# Patient Record
Sex: MALE | Race: WHITE | Employment: STUDENT | URBAN - METROPOLITAN AREA
[De-identification: names, ages, dates, MRNs, and addresses within clinical notes are randomized per-mention and may not be internally consistent; named-entity substitution may affect disease eponyms.]

---
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
## 2021-01-08 ENCOUNTER — HOSPITAL ENCOUNTER (OUTPATIENT)
Dept: PHYSICAL THERAPY | Age: 21
Setting detail: THERAPIES SERIES
Discharge: HOME | End: 2021-01-08
Payer: COMMERCIAL

## 2021-01-08 PROCEDURE — 97112 NEUROMUSCULAR REEDUCATION: CPT | Performed by: PHYSICAL THERAPIST

## 2021-01-08 PROCEDURE — 97530 THERAPEUTIC ACTIVITIES: CPT | Performed by: PHYSICAL THERAPIST

## 2021-01-08 PROCEDURE — 97110 THERAPEUTIC EXERCISES: CPT | Performed by: PHYSICAL THERAPIST

## 2021-01-08 NOTE — FLOWSHEET NOTE
[address] [facility],[address], [address] [facility], [address]  Phone: [phone]   Fax: [phone]      Physical Therapy Treatment Note/ Progress Report:       Date:  2021    Patient Name:  Natalie Bahena    :  2000  MRN: [id number]  Restrictions/Precautions:    Medical/Treatment Diagnosis Information:  · Diagnosis: M25.512 (ICD-10-CM) - Acute pain of left shoulder / MDI  · Treatment Diagnosis: M25.512 (ICD-10-CM) - Acute pain of left shoulder / MDI  Insurance/Certification information:  PT Insurance Information: [employer] [place]- BMN  Physician Information:  Referring Practitioner: Karen Palacios  Has the plan of care been signed (Y/N):        []  Yes  [x]  No     Date of Patient follow up with Physician: YOGI      Is this a Progress Report:     []  Yes  [x]  No        If Yes:  Date Range for reporting period:  Beginning  Ending    Progress report will be due (10 Rx or 30 days whichever is less):        Recertification will be due (POC Duration  / 90 days whichever is less): see above    Precautions/ Contra-indications:     C-SSRS Triggered by Intake questionnaire (Past 2 wk assessment):   [x] No, Questionnaire did not trigger screening.   [] Yes, Patient intake triggered further evaluation      [] C-SSRS Screening completed  [] PCP notified via Plan of Care  [] Emergency services notified     Visit # Insurance Allowable Auth Required   6 BMN []  Yes [x]  No        Functional Scale: UEFI= 91.25%   Date assessed:  20-    Latex Allergy:  [x]NO      []YES-  Preferred Language for Healthcare:   [x]English       []other:    Pain level:    @ rest=   0 /10   Worst over last 24 Hours=   0  /10    Subjective: The patient noted that his shld is feeling more stab and \"shifting\" is much less.      OBJECTIVE:      TTP: UT / LS and Infraspinatus moderate level of pain and guarding.      CERV ROM       Cervical Flexion       Cervical Extension       Left Right   Cervical SB       Cervical rotation             MMT Left Right   MMT Left Right    Flexion   5   Subscapularis- lift off    4+   Abduction   5   Bicep       ER neutral   4+   Tricep       IR neutral   4+    Level 1       Supraspinatus   4+    Level 2       Infraspinatus        Level 3       Lower Trap       ER @ 90-90       Mid Trap       IR @ 90-90       Rhomboids       Abdmonial       Teres Minor       Trunk Extension             ROM / MM Flexibility Left Right   ROM Left Right   Flexion 170 180   Hamstring 90/90       Abduction 108 w P 180   Pec Major       ER @ 90 90 90   Pec Minor       IR  @ 90 50 75   ITB- Obers       Adduction       Quad Prone       Total Arc       Hip Flexion- Vieyra       % Difference of Total Arc                     Postural Alignment     Kiblers Scapula Dysfunction Classification      Scapula Resting Position  (hands at sides)        Static: []? 1-Inferior [x]? 2-Medial []? 3-Superior         Scapular Resting Position  (hands on hips)     Dynamic: []? 1-Inferior [x]? 2-Medial []?  3-Superior      Scapular Mobility 10 reps flexion= + dysk  10 reps abd=                 Special Tests Left Right   Apley Scratch IR:  ER:   Cross body: IR:  ER:  Cross Body:   Neer's +     Full Can       Empty Can       Shelia Snooks +     Nerve Tension Testing       Speed's       Fletcher's        Anterior Drawer       MDI +             Spurling's -          Exercises:   Sets Reps Resistance Other comments         ROM / Stretching       Pendulum       Supine wand ER @ 0    [] 0        [] 45       [] 90   Table slides- Flexion    [] Flexion      [] Scap      [] Abd   Pulleys    [] Flexion      [] Scap      [] Abd   Wall crawls    [] Flexion      [] Scap      [] Abd   IR- Cane behind back        IR- Towel behind back       IR- SL- Sleeper       IR- Bully       UT- Stretch    LS- Stretch    CBA seated    [] Seated         [] @ EOB- SL             Isometrics              Flexion Abduction       External Rotation       Internal Rotation       Biceps       Triceps                 PRE's       Flexion       Abduction       Scaption       External Rotation 3  10 2 In side plank   Internal Rotation       Shrugs       EXT       Reverse Flys       Serratus 3 10 15#    Horizontal Abd with ER       Biceps       Triceps       Retraction       Prone T    [] on T ball        [] on EOB    Prone Y    [] on T ball        [] on EOB     Prone I    [] on T ball        [] on EOB                Cable Column/Theraband       External Rotation       Internal Rotation 3 10 silver    Shrugs       Lats 3 10 8 Plates    Ext 3 10 Silver     Flex       Scapular Retraction  Reverse   3 10 10 plates  10 plates     BIC       TRIC       PNF       Supine reverse T single arm 3 10 Green    RTB wall slides- 12 and 3        Supine serratus clock 1 to 6 and 12 to 3  3 10 Green    Supine scaption 2 10 Green  PNF pattern          Prone T Ball 3 10 2#    Prone Y Ball 3 10 0           Serratus plank on knees        UE [place]       Dynamic Stability       BOW wall  2# 30 3 CW / CCW   Plyoback       Body Blade with wall sit  30 3 [] Flexion  [] Scap  [] Abd   [x] ER/IR   Manual Intervention               K taping [facility]          Access Code: [email or web address]   URL: [email or web address]   Date: 11/20/2020   Prepared by: Baldo Coronel     Exercises   Seated Upper Trapezius Stretch - 5 reps - 1 sets - 30 hold - 1x daily - 7x weekly   Seated Levator Scapulae Stretch - 5 reps - 1 sets - 30 hold - 1x daily - 7x weekly   Supine Scapular Protraction in Flexion with Dumbbells - 10 reps - 3 sets - 1x daily - 7x weekly   Standing Shoulder Internal Rotation with Anchored Resistance - 10 reps - 3 sets - 1x daily - 7x weekly   Scapular Retraction with Resistance - 10 reps - 3 sets - 1x daily - 7x weekly   Shoulder External Rotation Reactive Isometrics - 10 reps - 1 sets - 10 hold - 1x daily - 7x weekly       Therapeutic Exercise and NMR EXR  [x] (01977) Provided verbal/tactile cueing for activities related to strengthening, flexibility, endurance, ROM for improvements in LE, proximal hip, and core control with self care, mobility, lifting, ambulation. [x] (09501) Provided verbal/tactile cueing for activities related to improving balance, coordination, kinesthetic sense, posture, motor skill, proprioception to assist with LE, proximal hip, and core control in self-care, mobility, lifting, ambulation and eccentric single leg control.      NMR and Therapeutic Activities:    [x] (23583 or 33441) Provided verbal/tactile cueing for activities related to improving balance, coordination, kinesthetic sense, posture, motor skill, proprioception and motor activation to allow for proper function of core, proximal hip and LE with self-care and ADLs and functional mobility.   [] (89291) Gait Re-education- Provided training and instruction to the patient for proper LE, core and proximal hip recruitment and positioning and eccentric body weight control with ambulation re-education including up and down stairs     Home Exercise Program:    [x] (99390) Reviewed/Progressed HEP activities related to strengthening, flexibility, endurance, ROM of core, proximal hip and LE for functional self-care, mobility, lifting and ambulation/stair navigation   [] (61424) Reviewed/Progressed HEP activities related to improving balance, coordination, kinesthetic sense, posture, motor skill, proprioception of core, proximal hip and LE for self-care, mobility, lifting, and ambulation/stair navigation      Manual Treatments:  PROM / STM / Oscillations-Mobs:  G-I, II, III, IV (PA's, Inf., Post.)  [] (36639) Provided manual therapy to mobilize LE, proximal hip and/or LS spine soft tissue/joints for the purpose of modulating pain, promoting relaxation, increasing ROM, reducing/eliminating soft tissue swelling/inflammation/restriction, improving soft tissue extensibility and allowing for proper ROM for normal function with self-care, mobility, lifting and ambulation. Modalities:     [] GAME READY (VASO)- for significant edema, swelling, pain control. Charges:  Timed Code Treatment Minutes: 65   Total Treatment Minutes: 68      [] EVAL (LOW) 05056 (typically 20 minutes face-to-face)  [] EVAL (MOD) 33205 (typically 30 minutes face-to-face)  [] EVAL (HIGH) 22976 (typically 45 minutes face-to-face)  [] RE-EVAL     [x] QH(69144) x  2   [] IONTO  [x] NMR (58265) x   1  [] VASO  [] Manual (83059) x     [] Other:  [x] TA x   1   [] Mech Traction (51160)  [] ES(attended) (34440)     [] ES (un) (29692):       ASSESSMENT:      GOALS:   Patient stated goal: use are without pain    [] Progressing: [] Met: [] Not Met: [] Adjusted    Therapist goals for Patient:   Short Term Goals: To be achieved in: 2 weeks  1. Independent in HEP and progression per patient tolerance, in order to prevent re-injury. [] Progressing: [] Met: [] Not Met: [] Adjusted   2. Patient will have a decrease in pain to facilitate improvement in movement, function, and ADLs as indicated by Functional Deficits. [] Progressing: [] Met: [] Not Met: [] Adjusted  3. Patient will report pain at worst less than or equal to 3/10. [] Progressing: [] Met: [] Not Met: [] Adjusted    Long Term Goals: To be achieved in: 4 weeks  1. Disability index score of 50% or less for the UEFI to assist with reaching prior level of function. [] Progressing: [] Met: [] Not Met: [] Adjusted  2. Patient will demonstrate increased AROM to LUE to allow for proper joint functioning as indicated by patients Functional Deficits. [] Progressing: [] Met: [] Not Met: [] Adjusted  3. Patient will demonstrate an increase in Strength to LUE to allow for proper functional mobility as indicated by patients Functional Deficits. [] Progressing: [] Met: [] Not Met: [] Adjusted  4.  Patient will return to independent functional activities without increased symptoms or restriction. [] Progressing: [] Met: [] Not Met: [] Adjusted  5. Patient able to reach overhead with limitations for ADL's     [] Progressing: [] Met: [] Not Met: [] Adjusted   6. Patient will report pain at worst less than or equal to 0/10. [] Progressing: [] Met: [] Not Met: [] Adjusted      Overall Progression Towards Functional goals/ Treatment Progress Update:  [] Patient is progressing as expected towards functional goals listed. [] Progression is slowed due to complexities/Impairments listed. [] Progression has been slowed due to co-morbidities. [x] Plan just implemented, too soon to assess goals progression <30days   [] Goals require adjustment due to lack of progress  [] Patient is not progressing as expected and requires additional follow up with physician  [] Other    Prognosis for POC: [x] Good [] Fair  [] Poor      Patient requires continued skilled intervention: [x] Yes  [] No    Treatment/Activity Tolerance:  [x] Patient able to complete treatment  [] Patient limited by fatigue  [] Patient limited by pain     [] Patient limited by other medical complications  [] Other: Mild to moderate fatigue at end of session. Updated activities in [facility]. The patient is ready to start some closed chain activities. Return to Play: (if applicable)   []  Stage 1: Intro to Strength   []  Stage 2: Return to Run and Strength   []  Stage 3: Return to Jump and Strength   []  Stage 4: Dynamic Strength and Agility   []  Stage 5: Sport Specific Training     []  Ready to Return to Play, Meets All Above Stages   []  Not Ready for Return to Sports   Comments:                           PLAN: Advance PRE's and dynamic stability training as tolerated. Consider adding closed chain activities NPV.  See in 2 weeks   [x] Continue per plan of care [] Alter current plan (see comments above)  [] Plan of care initiated [] Hold pending MD visit [] Discharge      Electronically signed by:  Valentine Tom PT      Note: If patient does not return for scheduled/ recommended follow up visits, this note will serve as a discharge from care along with most recent update on progress.

## 2021-01-14 ENCOUNTER — OFFICE VISIT (OUTPATIENT)
Dept: PRIMARY CARE CLINIC | Age: 21
End: 2021-01-14
Payer: COMMERCIAL

## 2021-01-14 DIAGNOSIS — Z20.822 EXPOSURE TO COVID-19 VIRUS: Primary | ICD-10-CM

## 2021-01-14 PROCEDURE — 99211 OFF/OP EST MAY X REQ PHY/QHP: CPT | Performed by: NURSE PRACTITIONER

## 2021-01-14 NOTE — PROGRESS NOTES
Claduio Moore received a viral test for COVID-19. They were educated on isolation and quarantine as appropriate. For any symptoms, they were directed to seek care from their PCP, given contact information to establish with a doctor, directed to an urgent care or the emergency room.

## 2021-01-15 LAB — SARS-COV-2, NAA: NOT DETECTED

## 2021-02-14 ENCOUNTER — IMMUNIZATION (OUTPATIENT)
Dept: PRIMARY CARE CLINIC | Age: 21
End: 2021-02-14
Payer: COMMERCIAL

## 2021-02-14 PROCEDURE — 91301 COVID-19, MODERNA VACCINE 100MCG/0.5ML DOSE: CPT | Performed by: FAMILY MEDICINE

## 2021-02-14 PROCEDURE — 0011A COVID-19, MODERNA VACCINE 100MCG/0.5ML DOSE: CPT | Performed by: FAMILY MEDICINE

## 2021-02-18 ENCOUNTER — OFFICE VISIT (OUTPATIENT)
Dept: ORTHOPEDIC SURGERY | Age: 21
End: 2021-02-18
Payer: COMMERCIAL

## 2021-02-18 VITALS
BODY MASS INDEX: 22.88 KG/M2 | DIASTOLIC BLOOD PRESSURE: 74 MMHG | HEIGHT: 68 IN | RESPIRATION RATE: 12 BRPM | WEIGHT: 151 LBS | HEART RATE: 54 BPM | SYSTOLIC BLOOD PRESSURE: 136 MMHG | TEMPERATURE: 97.6 F

## 2021-02-18 DIAGNOSIS — M25.312 INSTABILITY OF LEFT SHOULDER JOINT: Primary | ICD-10-CM

## 2021-02-18 PROCEDURE — 99212 OFFICE O/P EST SF 10 MIN: CPT | Performed by: ORTHOPAEDIC SURGERY

## 2021-02-18 NOTE — PROGRESS NOTES
Geoffrey Alexander turns today for his left shoulder. He is doing very well. He reports no pain at this point. He says his shoulder really feels much more stable and he said no episodes of instability recently. He no longer feels the need to wear his. He feels confident in his home exercise program.          Patient's medications, allergies, past medical, surgical, social and family histories were reviewed and updated as appropriate. Relevant review of systems reviewed. General Exam:    Vitals: Blood pressure 136/74, pulse 54, temperature 97.6 °F (36.4 °C), temperature source Infrared, resp. rate 12, height 5' 8\" (1.727 m), weight 151 lb (68.5 kg). Constitutional: Patient is adequately groomed with no evidence of malnutrition  Mental Status: The patient is oriented to time, place and person. The patient's mood and affect are appropriate. Left shoulder exam is very benign. He has good strength with normal motion. I cannot elicit discomfort. Assessment: Doing well with conservative management for multidirectional instability left shoulder. Plan: I encouraged him to maintain his exercise program.  When he resumes contact and collision sports including soccer he should wear his brace. He agrees with this plan. Follow-up with me on an as-needed basis. This note was created using voice recognition software. It has been proofread, but occasionally errors remain. Please disregard these errors. They will be corrected as they are noted.

## 2021-03-17 ENCOUNTER — IMMUNIZATION (OUTPATIENT)
Dept: PRIMARY CARE CLINIC | Age: 21
End: 2021-03-17
Payer: COMMERCIAL

## 2021-03-17 PROCEDURE — 0012A COVID-19, MODERNA VACCINE 100MCG/0.5ML DOSE: CPT | Performed by: FAMILY MEDICINE

## 2021-03-17 PROCEDURE — 91301 COVID-19, MODERNA VACCINE 100MCG/0.5ML DOSE: CPT | Performed by: FAMILY MEDICINE

## 2021-08-13 ENCOUNTER — OFFICE VISIT (OUTPATIENT)
Dept: FAMILY MEDICINE CLINIC | Age: 21
End: 2021-08-13
Payer: COMMERCIAL

## 2021-08-13 VITALS
BODY MASS INDEX: 21.96 KG/M2 | HEIGHT: 70 IN | HEART RATE: 61 BPM | WEIGHT: 153.4 LBS | OXYGEN SATURATION: 98 % | TEMPERATURE: 98.4 F | SYSTOLIC BLOOD PRESSURE: 98 MMHG | DIASTOLIC BLOOD PRESSURE: 70 MMHG

## 2021-08-13 DIAGNOSIS — L70.0 ACNE VULGARIS: ICD-10-CM

## 2021-08-13 DIAGNOSIS — Z00.00 WELL ADULT HEALTH CHECK: Primary | ICD-10-CM

## 2021-08-13 PROCEDURE — 99395 PREV VISIT EST AGE 18-39: CPT | Performed by: FAMILY MEDICINE

## 2021-08-13 ASSESSMENT — ENCOUNTER SYMPTOMS
NAUSEA: 0
SORE THROAT: 0
SHORTNESS OF BREATH: 0
COLOR CHANGE: 0
VOMITING: 0
SINUS PRESSURE: 0
DIARRHEA: 0
COUGH: 0
EYE REDNESS: 0

## 2021-08-13 ASSESSMENT — PATIENT HEALTH QUESTIONNAIRE - PHQ9
SUM OF ALL RESPONSES TO PHQ QUESTIONS 1-9: 0
SUM OF ALL RESPONSES TO PHQ QUESTIONS 1-9: 0
2. FEELING DOWN, DEPRESSED OR HOPELESS: 0
SUM OF ALL RESPONSES TO PHQ9 QUESTIONS 1 & 2: 0
1. LITTLE INTEREST OR PLEASURE IN DOING THINGS: 0
SUM OF ALL RESPONSES TO PHQ QUESTIONS 1-9: 0

## 2021-08-13 NOTE — PROGRESS NOTES
8/13/2021    This is a 24 y.o. 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 HENT: Negative for congestion, sinus pressure and sore throat. Eyes: Negative for redness and visual disturbance. Respiratory: Negative for cough and shortness of breath. Cardiovascular: Negative for chest pain and leg swelling. Gastrointestinal: Negative for diarrhea, nausea and vomiting. Genitourinary: Negative for difficulty urinating. Skin: Negative for color change and rash. Neurological: Negative for dizziness and headaches. Psychiatric/Behavioral: Negative for confusion. Current Outpatient Medications   Medication Sig Dispense Refill    naproxen (NAPROSYN) 500 MG tablet Take 1 tablet by mouth 2 times daily (with meals) 60 tablet 2    cephALEXin (KEFLEX) 500 MG capsule TAKE 1 CAPSULE BY MOUTH ONE TIME A DAY 90 capsule 2    benzoyl peroxide-erythromycin (BENZAMYCIN) 5-3 % gel APPLY TOPICALLY TWO TIMES A DAY 46.6 g 5    Multiple Vitamin (MULTI-DAY VITAMINS PO) Take by mouth      Cetirizine HCl (ZYRTEC PO) Take by mouth       No current facility-administered medications for this visit. BP 98/70 (Site: Left Upper Arm, Position: Sitting, Cuff Size: Medium Adult)   Pulse 61   Temp 98.4 °F (36.9 °C)   Ht 5' 9.69\" (1.77 m)   Wt 153 lb 6.4 oz (69.6 kg)   SpO2 98%   BMI 22.21 kg/m²     Physical Exam  Constitutional:       Appearance: He is well-developed. HENT:      Head: Normocephalic and atraumatic. Eyes:      Conjunctiva/sclera: Conjunctivae normal.   Neck:      Thyroid: No thyromegaly.    Cardiovascular:      Rate and Rhythm: Normal rate and regular rhythm. Heart sounds: Normal heart sounds. No murmur heard. Pulmonary:      Effort: Pulmonary effort is normal.      Breath sounds: Normal breath sounds. No wheezing. Abdominal:      General: Bowel sounds are normal.      Palpations: Abdomen is soft. There is no mass. Tenderness: There is no abdominal tenderness. Musculoskeletal:         General: Normal range of motion. Cervical back: Normal range of motion and neck supple. Lymphadenopathy:      Cervical: No cervical adenopathy. Skin:     General: Skin is warm and dry. Findings: No rash. Comments: Normal turgor   Neurological:      Mental Status: He is alert and oriented to person, place, and time. Deep Tendon Reflexes: Reflexes normal.   Psychiatric:         Thought Content: Thought content normal.         Wt Readings from Last 3 Encounters:   08/13/21 153 lb 6.4 oz (69.6 kg) (48 %, Z= -0.06)*   02/18/21 151 lb (68.5 kg) (46 %, Z= -0.09)*   12/19/20 151 lb (68.5 kg) (47 %, Z= -0.06)*     * Growth percentiles are based on CDC (Boys, 2-20 Years) data. BP Readings from Last 3 Encounters:   08/13/21 98/70   02/18/21 136/74   10/02/20 110/70       Assessment/Plan:  1. Well adult health check    2. Acne vulgaris    That is doing well overall. We reviewed his goals, he is working hard to achieve these with his academics. He is making good positive healthy choices. He will be discontinuing the Keflex for his acne as he feels it is pretty well controlled. We discussed he can restart this if needed if the acne flares up significantly.     Physical in 1 year

## 2022-01-07 ENCOUNTER — NURSE ONLY (OUTPATIENT)
Dept: PRIMARY CARE CLINIC | Age: 22
End: 2022-01-07

## 2022-01-07 DIAGNOSIS — Z11.52 ENCOUNTER FOR SCREENING FOR COVID-19: Primary | ICD-10-CM

## 2022-01-08 LAB — SARS-COV-2: NOT DETECTED

## 2023-02-28 ENCOUNTER — TELEPHONE (OUTPATIENT)
Dept: FAMILY MEDICINE CLINIC | Age: 23
End: 2023-02-28

## 2023-02-28 NOTE — TELEPHONE ENCOUNTER
----- Message from [facility] sent at 2/28/2023 12:34 PM EST -----  Subject: Appointment Request    Reason for Call: Established Patient Appointment needed: Urgent Abdominal   Pain    QUESTIONS    Reason for appointment request? Available appointments did not meet   patient need     Additional Information for Provider? please call pt to schedule appt for   c/o nausea after eating meals/with car rides attempted to schedule   (Urgent) in  but cannot do today or tomorrow morning wants to be   scheduled next week please call pt   ---------------------------------------------------------------------------  --------------  [phone] "[employer]  [phone];  Do not leave any message, patient will call back for answer  ---------------------------------------------------------------------------  --------------  SCRIPT ANSWERS  COVID Screen: [place]

## 2023-03-06 ENCOUNTER — OFFICE VISIT (OUTPATIENT)
Dept: FAMILY MEDICINE CLINIC | Age: 23
End: 2023-03-06
Payer: COMMERCIAL

## 2023-03-06 VITALS
HEART RATE: 53 BPM | SYSTOLIC BLOOD PRESSURE: 108 MMHG | DIASTOLIC BLOOD PRESSURE: 60 MMHG | WEIGHT: 153 LBS | OXYGEN SATURATION: 100 % | HEIGHT: 69 IN | RESPIRATION RATE: 16 BRPM | BODY MASS INDEX: 22.66 KG/M2

## 2023-03-06 DIAGNOSIS — R19.4 CHANGE IN BOWEL HABIT: ICD-10-CM

## 2023-03-06 DIAGNOSIS — R19.4 CHANGE IN BOWEL HABIT: Primary | ICD-10-CM

## 2023-03-06 DIAGNOSIS — F43.9 STRESS: ICD-10-CM

## 2023-03-06 LAB
BASOPHILS ABSOLUTE: 0 K/UL (ref 0–0.2)
BASOPHILS RELATIVE PERCENT: 0.2 %
C-REACTIVE PROTEIN: <3 MG/L (ref 0–5.1)
EOSINOPHILS ABSOLUTE: 0.2 K/UL (ref 0–0.6)
EOSINOPHILS RELATIVE PERCENT: 3.1 %
HCT VFR BLD CALC: 42.4 % (ref 40.5–52.5)
HEMOGLOBIN: 14.7 G/DL (ref 13.5–17.5)
IGA: 173 MG/DL (ref 70–400)
LYMPHOCYTES ABSOLUTE: 2.8 K/UL (ref 1–5.1)
LYMPHOCYTES RELATIVE PERCENT: 45.5 %
MCH RBC QN AUTO: 30.4 PG (ref 26–34)
MCHC RBC AUTO-ENTMCNC: 34.6 G/DL (ref 31–36)
MCV RBC AUTO: 87.9 FL (ref 80–100)
MONOCYTES ABSOLUTE: 0.6 K/UL (ref 0–1.3)
MONOCYTES RELATIVE PERCENT: 10.1 %
NEUTROPHILS ABSOLUTE: 2.5 K/UL (ref 1.7–7.7)
NEUTROPHILS RELATIVE PERCENT: 41.1 %
PDW BLD-RTO: 14.1 % (ref 12.4–15.4)
PLATELET # BLD: 270 K/UL (ref 135–450)
PMV BLD AUTO: 8.7 FL (ref 5–10.5)
RBC # BLD: 4.82 M/UL (ref 4.2–5.9)
WBC # BLD: 6.1 K/UL (ref 4–11)

## 2023-03-06 PROCEDURE — 99213 OFFICE O/P EST LOW 20 MIN: CPT | Performed by: FAMILY MEDICINE

## 2023-03-06 SDOH — ECONOMIC STABILITY: INCOME INSECURITY: HOW HARD IS IT FOR YOU TO PAY FOR THE VERY BASICS LIKE FOOD, HOUSING, MEDICAL CARE, AND HEATING?: NOT HARD AT ALL

## 2023-03-06 SDOH — ECONOMIC STABILITY: HOUSING INSECURITY
IN THE LAST 12 MONTHS, WAS THERE A TIME WHEN YOU DID NOT HAVE A STEADY PLACE TO SLEEP OR SLEPT IN A SHELTER (INCLUDING NOW)?: NO

## 2023-03-06 SDOH — ECONOMIC STABILITY: FOOD INSECURITY: WITHIN THE PAST 12 MONTHS, YOU WORRIED THAT YOUR FOOD WOULD RUN OUT BEFORE YOU GOT MONEY TO BUY MORE.: NEVER TRUE

## 2023-03-06 SDOH — ECONOMIC STABILITY: FOOD INSECURITY: WITHIN THE PAST 12 MONTHS, THE FOOD YOU BOUGHT JUST DIDN'T LAST AND YOU DIDN'T HAVE MONEY TO GET MORE.: NEVER TRUE

## 2023-03-06 ASSESSMENT — PATIENT HEALTH QUESTIONNAIRE - PHQ9
2. FEELING DOWN, DEPRESSED OR HOPELESS: 3
SUM OF ALL RESPONSES TO PHQ QUESTIONS 1-9: 6
8. MOVING OR SPEAKING SO SLOWLY THAT OTHER PEOPLE COULD HAVE NOTICED. OR THE OPPOSITE, BEING SO FIGETY OR RESTLESS THAT YOU HAVE BEEN MOVING AROUND A LOT MORE THAN USUAL: 0
3. TROUBLE FALLING OR STAYING ASLEEP: 0
SUM OF ALL RESPONSES TO PHQ QUESTIONS 1-9: 6
SUM OF ALL RESPONSES TO PHQ9 QUESTIONS 1 & 2: 4
SUM OF ALL RESPONSES TO PHQ QUESTIONS 1-9: 6
5. POOR APPETITE OR OVEREATING: 0
SUM OF ALL RESPONSES TO PHQ QUESTIONS 1-9: 6
9. THOUGHTS THAT YOU WOULD BE BETTER OFF DEAD, OR OF HURTING YOURSELF: 0
6. FEELING BAD ABOUT YOURSELF - OR THAT YOU ARE A FAILURE OR HAVE LET YOURSELF OR YOUR FAMILY DOWN: 0
4. FEELING TIRED OR HAVING LITTLE ENERGY: 1
1. LITTLE INTEREST OR PLEASURE IN DOING THINGS: 1
10. IF YOU CHECKED OFF ANY PROBLEMS, HOW DIFFICULT HAVE THESE PROBLEMS MADE IT FOR YOU TO DO YOUR WORK, TAKE CARE OF THINGS AT HOME, OR GET ALONG WITH OTHER PEOPLE: 0
7. TROUBLE CONCENTRATING ON THINGS, SUCH AS READING THE NEWSPAPER OR WATCHING TELEVISION: 1

## 2023-03-06 NOTE — PROGRESS NOTES
3/6/2023    This is a 25 y.o. male   Chief Complaint   Patient presents with    Nausea     Pt get motion sick after eating and getting in the car. Eating and sitting around he is fine      HPI    Here for concerns for episodes of not feeling well. Sometimes after eating will have to immediately go to the bathroom with loose stool. Sometimes gets motion sick in the car if it's immediately after eating.  - no significant dietary changes    Of note, he completed a 6 month [employer] college program in [employer]. Just recently returned. He notes stress at home / family life seems to be getting to him since being back home, also not enjoying school now that he has had taste of being in the work force. Overall going on for the past few weeks. Will have a few good days, and then a few days that are more noticeable with symptoms.   - has nausea at times, but no vomiting  - no significant abdominal pain  - no med changes  - no blood in stool (sometimes there is some with wiping/irritation)      Review of Systems     Current Outpatient Medications   Medication Sig Dispense Refill    Multiple Vitamin (MULTI-DAY VITAMINS PO) Take by mouth      Cetirizine HCl (ZYRTEC PO) Take by mouth       No current facility-administered medications for this visit. /60   Pulse 53   Resp 16   Ht 5' 9\" (1.753 m)   Wt 153 lb (69.4 kg)   SpO2 100%   BMI 22.59 kg/m²     Physical Exam  Constitutional:       Appearance: Normal appearance. HENT:      Head: Normocephalic and atraumatic. Abdominal:      General: Abdomen is flat. Bowel sounds are normal. There is no distension. Palpations: There is no mass. Tenderness: There is no abdominal tenderness. There is no guarding or rebound. Neurological:      Mental Status: He is alert.        Wt Readings from Last 3 Encounters:   03/06/23 153 lb (69.4 kg)   08/13/21 153 lb 6.4 oz (69.6 kg) (48 %, Z= -0.06)*   02/18/21 151 lb (68.5 kg) (46 %, Z= -0.09)*     * Growth percentiles are based on CDC (Boys, 2-20 Years) data. BP Readings from Last 3 Encounters:   03/06/23 108/60   08/13/21 98/70   02/18/21 136/74     Assessment/Plan:  1. Change in bowel habit  - CBC with Auto Differential; Future  - TISSUE TRANSGLUTAMINASE, IGA; Future  - IgA; Future  - C-Reactive Protein; Future    2. Stress    Normal abdominal exam.  His history points to likely stress related bowel changes. He notes that he feels this is likely but wanted to rule out other medical causes. Check labs as above. At this time does not warrant a colonoscopy. However, if symptoms were to worsen or fail to improve we would consider this.   Discussed methods of managing stress, potential dietary triggers    Follow-up in 3 months if symptoms have not improved, sooner if needed

## 2023-03-07 LAB — TISSUE TRANSGLUTAMINASE IGA: <0.5 U/ML (ref 0–14)

## 2023-10-09 ENCOUNTER — NURSE ONLY (OUTPATIENT)
Dept: FAMILY MEDICINE CLINIC | Age: 23
End: 2023-10-09
Payer: COMMERCIAL

## 2023-10-09 DIAGNOSIS — Z23 NEED FOR INFLUENZA VACCINATION: Primary | ICD-10-CM

## 2023-10-09 PROCEDURE — 90674 CCIIV4 VAC NO PRSV 0.5 ML IM: CPT | Performed by: FAMILY MEDICINE

## 2023-10-09 PROCEDURE — 90471 IMMUNIZATION ADMIN: CPT | Performed by: FAMILY MEDICINE
